# Patient Record
Sex: FEMALE | Race: WHITE | Employment: FULL TIME | URBAN - METROPOLITAN AREA
[De-identification: names, ages, dates, MRNs, and addresses within clinical notes are randomized per-mention and may not be internally consistent; named-entity substitution may affect disease eponyms.]

---

## 2019-06-22 ENCOUNTER — HOSPITAL ENCOUNTER (EMERGENCY)
Facility: CLINIC | Age: 54
Discharge: HOME | End: 2019-06-22
Attending: EMERGENCY MEDICINE | Admitting: EMERGENCY MEDICINE
Payer: COMMERCIAL

## 2019-06-22 VITALS
WEIGHT: 180 LBS | HEART RATE: 82 BPM | DIASTOLIC BLOOD PRESSURE: 83 MMHG | SYSTOLIC BLOOD PRESSURE: 120 MMHG | TEMPERATURE: 97.4 F | OXYGEN SATURATION: 94 % | RESPIRATION RATE: 18 BRPM

## 2019-06-22 DIAGNOSIS — S09.90XA CLOSED HEAD INJURY, INITIAL ENCOUNTER: ICD-10-CM

## 2019-06-22 DIAGNOSIS — S00.03XA CONTUSION OF SCALP, INITIAL ENCOUNTER: ICD-10-CM

## 2019-06-22 DIAGNOSIS — S01.01XA LACERATION OF SCALP, INITIAL ENCOUNTER: ICD-10-CM

## 2019-06-22 PROCEDURE — 99283 EMERGENCY DEPT VISIT LOW MDM: CPT | Mod: 25

## 2019-06-22 PROCEDURE — 12001 RPR S/N/AX/GEN/TRNK 2.5CM/<: CPT

## 2019-06-22 RX ORDER — LIDOCAINE HYDROCHLORIDE AND EPINEPHRINE 10; 10 MG/ML; UG/ML
INJECTION, SOLUTION INFILTRATION; PERINEURAL
Status: DISCONTINUED
Start: 2019-06-22 | End: 2019-06-22 | Stop reason: HOSPADM

## 2019-06-22 ASSESSMENT — ENCOUNTER SYMPTOMS
WEAKNESS: 0
WOUND: 1
CONFUSION: 0
HEADACHES: 0
NECK PAIN: 0

## 2019-06-22 NOTE — ED NOTES
"Pt says she is pain free \"much better than before.\" No other complaints or requests. Neuros intact. MD suturing.   "

## 2019-06-22 NOTE — ED AVS SNAPSHOT
[facility] Emergency Department  [address]  [facility] [address]  Phone:  [phone]  Fax:  [phone]                                    Jacinta Leone   MRN: [id number]    Department:  [facility] Emergency Department   Date of Visit:  6/22/2019           After Visit Summary Signature Page    I have received my discharge instructions, and my questions have been answered. I have discussed any challenges I see with this plan with the nurse or doctor.    ..........................................................................................................................................  Patient/Patient Representative Signature      ..........................................................................................................................................  Patient Representative Print Name and Relationship to Patient    ..................................................               ................................................  Date                                   Time    ..........................................................................................................................................  Reviewed by Signature/Title    ...................................................              ..............................................  Date                                               Time          22EPIC Rev 08/18

## 2019-06-22 NOTE — DISCHARGE INSTRUCTIONS
Discharge Instructions  Head Injury    You have been seen today for a head injury. Your evaluation included a history and physical examination. You may have had a CT (CAT) scan performed, though most head injuries do not require a scan. Based on this evaluation, your provider today does not feel that your head injury is serious.    Generally, every Emergency Department visit should have a follow-up clinic visit with either a primary or a specialty clinic/provider. Please follow-up as instructed by your emergency provider today.  Return to the Emergency Department if:  You are confused or you are not acting right.  Your headache gets worse or you start to have a really bad headache even with your recommended treatment plan.  You vomit (throw up) more than once.  You have a seizure.  You have trouble walking.  You have weakness or paralysis (cannot move) in an arm or a leg.  You have blood or fluid coming from your ears or nose.  You have new symptoms or anything that worries you.    Sleeping:  It is okay for you to sleep, but someone should wake you up if instructed by your provider, and someone should check on you at your usual time to wake up.     Activity:  Do not drive for at least 24 hours.  Do not drive if you have dizzy spells or trouble concentrating, or remembering things.  Do not return to any contact sports until cleared by your regular provider.     MORE INFORMATION:    Concussion:  A concussion is a minor head injury that may cause temporary problems with the way the brain works. Although concussions are important, they are generally not an emergency or a reason that a person needs to be hospitalized. Some concussion symptoms include confusion, amnesia (forgetful), nausea (sick to your stomach) and vomiting (throwing up), dizziness, fatigue, memory or concentration problems, irritability and sleep problems. For most people, concussions are mild and temporary but some will have more severe and persistent  symptoms that require on-going care and treatment.  CT Scans: Your evaluation today may have included a CT scan (CAT scan) to look for things like bleeding or a skull fracture (broken bone).  CT scans involve radiation and too many CT scans can cause serious health problems like cancer, especially in children.  Because of this, your provider may not have ordered a CT scan today if they think you are at low risk for a serious or life threatening problem.    If you were given a prescription for medicine here today, be sure to read all of the information (including the package insert) that comes with your prescription.  This will include important information about the medicine, its side effects, and any warnings that you need to know about.  The pharmacist who fills the prescription can provide more information and answer questions you may have about the medicine.  If you have questions or concerns that the pharmacist cannot address, please call or return to the Emergency Department.     Remember that you can always come back to the Emergency Department if you are not able to see your regular provider in the amount of time listed above, if you get any new symptoms, or if there is anything that worries you.   Discharge Instructions  Laceration (Cut)    You were seen today for a laceration (cut).  Your provider examined your laceration for any problems such a buried foreign body (like glass, a splinter, or gravel), or injury to blood vessels, tendons, and nerves.  Your provider may have also rinsed and/or scrubbed your laceration to help prevent an infection. It may not be possible to find all problems with your laceration on the first visit; occasionally foreign bodies or a tendon injury can go undetected.    Your laceration may have been closed in one of several ways:  No closure: many wounds will heal just fine without closure.  Stitches: regular stitches that require removal.  Staples: skin staples are often used in  the scalp/head.  Wound adhesive (glue): skin glue can be used for certain lacerations and doesn t require removal.  Wound strips (aka Butterfly bandages or steri-strips): these are bandages that help to close a wound.  Absorbable stitches:  dissolving  stitches that go away on their own and usually don t require removal.    A small percentage of wounds will develop an infection regardless of how well the wound is cared for. Antibiotics are generally not indicated to prevent an infection so are only given for a small number of high-risk wounds. Some lacerations are too high risk to close, and are left open to heal because closure can increase the likelihood that an infection will develop.    Remember that all lacerations, no matter how expertly repaired, will cause scarring. We consider many factors, techniques, and materials, in our efforts to provide the best possible cosmetic outcome.    Generally, every Emergency Department visit should have a follow-up clinic visit with either a primary or a specialty clinic/provider. Please follow-up as instructed by your emergency provider today.     Return to the Emergency Department right away if:  You have more redness, swelling, pain, drainage (pus), a bad smell, or red streaking from your laceration as these symptoms could indicate an infection.  You have a fever of 100.4 F or more.  You have bleeding that you cannot stop at home. If your cut starts to bleed, hold pressure on the bleeding area with a clean cloth or put pressure over the bandage.  If the bleeding does not stop after using constant pressure for 30 minutes, you should return to the Emergency Department for further treatment.  An area past the laceration is cool, pale, or blue compared with the other side, or has a slower return of color when squeezed.  Your dressing seems too tight or starts to get uncomfortable or painful. For children, signs of a problem might be irritability or restlessness.  You have  loss of normal function or use of an area, such as being unable to straighten or bend a finger normally.  You have a numb area past the laceration.    Return to the Emergency Department or see your regular provider if:  The laceration starts to come open.   You have something coming out of the cut or a feeling that there is something in the laceration.  Your wound will not heal, or keeps breaking open. There can always be glass, wood, dirt or other things in any wound.  They will not always show up, even on x-rays.  If a wound does not heal, this may be why, and it is important to follow-up with your regular provider.    Home Care:  Take your dressing off in 12-24 hours, or as instructed by your provider, to check your laceration. Remove the dressing sooner if it seems too tight or painful, or if it is getting numb, tingly, or pale past the dressing.  Gently wash your laceration 1-2 times daily with clean water and mild soap. It is okay to shower or run clean water over the laceration, but do not let the laceration soak in water (no swimming).  If your laceration was closed with wound adhesive or strips: pat it dry and leave it open to the air. For all other repairs: after you wash your laceration, or at least 2 times a day, apply antibiotic ointment (such as Neosporin  or Bacitracin ) to the laceration, then cover it with a Band-Aid  or gauze.  Keep the laceration clean. Wear gloves or other protective clothing if you are around dirt.    Follow-up for removal:  If your wound was closed with staples or regular stitches, they need to be removed according to the instructions and timeline specified by your provider today.  If your wound was closed with absorbable ( dissolving ) sutures, they should fall out, dissolve, or not be visible in about one week. If they are still visible, then they should be removed according to the instructions and timeline specified by your provider today.    Scars:  To help minimize  scarring:  Wear sunscreen over the healed laceration when out in the sun.  Massage the area regularly once healed.  You may apply Vitamin E to the healed wound.  Wait. Scars improve in appearance over months and years.    If you were given a prescription for medicine here today, be sure to read all of the information (including the package insert) that comes with your prescription.  This will include important information about the medicine, its side effects, and any warnings that you need to know about.  The pharmacist who fills the prescription can provide more information and answer questions you may have about the medicine.  If you have questions or concerns that the pharmacist cannot address, please call or return to the Emergency Department.       Remember that you can always come back to the Emergency Department if you are not able to see your regular provider in the amount of time listed above, if you get any new symptoms, or if there is anything that worries you.

## 2019-06-22 NOTE — ED TRIAGE NOTES
Pt fell tonight and hit head on doorknob, now lac to back of head.  Denies LOC.  Endorses ETOH use tonight.

## 2019-06-22 NOTE — ED PROVIDER NOTES
History     Chief Complaint:  Fall    HPI   Jacinta Leone is a 54 year old female who presents with fall. The patient notes she had several alcoholic drinks tonight. She notes several hours ago she was walking and tripped knocking her head on the door nob. The patient notes that the area was bleeding and she felt a burning sensation. She states she tried to ice the area for an hour but the pain did not improve, so she decided to come into the ED. She denies neck pain, headache, confusion, weakness, or loss of consciousness. The patient denies any use of blood thinners. The patient reports that her Tdap is up to date.     Allergies:  No Known Allergies     Medications:    Ativan     Past Medical History:    Anxiety   Herniated disk     Social History:  The patient was accompanied to the ED by daughter.  Smoking Status: Current Every Day Smoker   Alcohol Use: Positive  Marital Status:       Review of Systems   Musculoskeletal: Negative for neck pain.   Skin: Positive for wound.   Neurological: Negative for syncope, weakness and headaches.   Psychiatric/Behavioral: Negative for confusion.   All other systems reviewed and are negative.    Physical Exam     Patient Vitals for the past 24 hrs:   BP Temp Pulse Resp SpO2 Weight   06/22/19 0106 124/83 97.4  F (36.3  C) 84 18 97 % 81.6 kg (180 lb)     Physical Exam    General: Adult female sitting upright.  Eyes: PERRL, Conjunctive within normal limits. EOMI.  HENT: Palpable tender posterior scalp hematoma with 1cm overlying gaping laceration. No visible galea or skull. Mild oozing of blood. Moist mucous membranes, oropharynx clear.   Neck: Nontender. Normal AROM.  Resp: Normal respiratory effort.  MSK: Ambulatory with steady gait. Nontender.   Skin: Warm and dry. Scalp laceration as above. No rashes or lesions or ecchymoses on visible skin.  Neuro: Alert and oriented. GCS 15. Responds appropriately to all questions and commands. No focal findings appreciated.  Normal muscle tone.  Psych: Normal mood and affect. Pleasant.  Emergency Department Course   Procedures:    Laceration Repair        LACERATION:  A simple clean 1 cm laceration.      LOCATION:  Posterior scalp       ANESTHESIA:  Local using 1% Lidocaine with epinephrine total of 3 mLs      FUNCTION:  Distally sensation, circulation and motor are intact.      PREPARATION:  Irrigation with Normal Saline      DEBRIDEMENT:  no debridement      CLOSURE:  Wound was closed with One Layer.  Skin closed with 1 x 4.0 Ethylon using interrupted sutures.    Emergency Department Course:  Nursing notes and vitals reviewed.    0112 I performed an exam of the patient as documented above.     Local anesthesia and wound care.     0200 I preformed the laceration repair as noted above. Patient denies any new symptoms or concerns. Note the laceration pain is gone. Prior to discharge, I personally answered all related questions . Patient voiced understanding.     Impression & Plan      Medical Decision Making:  Jacinta Leone is a 54 year old female who presents to the emergency department today for evaluation of  laceration to the back of the head that occurred three hours ago. By the PECARN head CT rules the patient does not warrant head CT evaluation and I believe she is very low risk for skull fracture or intracerebral bleeding. Concussion is likewise of very low probability with no loss of consciousness and normal mental status here. There are no signs of entrapment or displaced fracture on detailed clinical exam. Cervical spine was cleared clinically. The head to toe trauma exam is otherwise negative and further trauma workup is not necessary. The wound was carefully evaluated and explored. The laceration was closed with sutures as described above. There is no evidence of foreign body or bony damage with this laceration. Possible complications including scarring and infection were reviewed with the patient who voiced  understanding. I discussed appropriate return precautions warranting immediate medical treatment and all questions were answered. They will follow up with their PCP as noted in the discharge section. Tetanus is up to date.    Diagnosis:    ICD-10-CM    1. Closed head injury, initial encounter S09.90XA    2. Contusion of scalp, initial encounter S00.03XA    3. Laceration of scalp, initial encounter S01.01XA      Disposition:   The patient is discharged to home.    Discharge Medications:  No discharge medication.     Scribe Disclosure:  Astrid [email or web address], am serving as a scribe at 1:18 AM on 6/22/2019 to document services personally performed by Caryl Emerson MD based on my observations and the provider's statements to me.  [facility] EMERGENCY DEPARTMENT       Caryl Emerson MD  06/22/19 0305

## 2020-09-16 ENCOUNTER — HOSPITAL ENCOUNTER (EMERGENCY)
Facility: CLINIC | Age: 55
Discharge: HOME | End: 2020-09-16
Attending: NURSE PRACTITIONER | Admitting: NURSE PRACTITIONER
Payer: COMMERCIAL

## 2020-09-16 ENCOUNTER — APPOINTMENT (OUTPATIENT)
Dept: MRI IMAGING | Facility: CLINIC | Age: 55
End: 2020-09-16
Attending: NURSE PRACTITIONER
Payer: COMMERCIAL

## 2020-09-16 VITALS
SYSTOLIC BLOOD PRESSURE: 111 MMHG | HEART RATE: 76 BPM | DIASTOLIC BLOOD PRESSURE: 63 MMHG | RESPIRATION RATE: 16 BRPM | OXYGEN SATURATION: 95 % | TEMPERATURE: 96.7 F

## 2020-09-16 DIAGNOSIS — G90.50 RSD (REFLEX SYMPATHETIC DYSTROPHY): ICD-10-CM

## 2020-09-16 DIAGNOSIS — M54.42 ACUTE BILATERAL LOW BACK PAIN WITH LEFT-SIDED SCIATICA: ICD-10-CM

## 2020-09-16 DIAGNOSIS — M54.42 BILATERAL LOW BACK PAIN WITH LEFT-SIDED SCIATICA: ICD-10-CM

## 2020-09-16 PROBLEM — G90.512 COMPLEX REGIONAL PAIN SYNDROME TYPE 1 OF LEFT UPPER EXTREMITY: Status: ACTIVE | Noted: 2020-07-24

## 2020-09-16 PROBLEM — G89.29 NECK PAIN, CHRONIC: Status: ACTIVE | Noted: 2020-07-24

## 2020-09-16 PROBLEM — M54.2 NECK PAIN, CHRONIC: Status: ACTIVE | Noted: 2020-07-24

## 2020-09-16 PROBLEM — F33.1 MODERATE EPISODE OF RECURRENT MAJOR DEPRESSIVE DISORDER (H): Status: ACTIVE | Noted: 2020-09-05

## 2020-09-16 PROBLEM — G44.40 MEDICATION OVERUSE HEADACHE: Status: ACTIVE | Noted: 2020-07-24

## 2020-09-16 LAB
ALBUMIN SERPL-MCNC: 3.8 G/DL (ref 3.4–5)
ALBUMIN UR-MCNC: 10 MG/DL
ALP SERPL-CCNC: 62 U/L (ref 40–150)
ALT SERPL W P-5'-P-CCNC: 39 U/L (ref 0–50)
AMORPH CRY #/AREA URNS HPF: ABNORMAL /HPF
ANION GAP SERPL CALCULATED.3IONS-SCNC: 6 MMOL/L (ref 3–14)
APPEARANCE UR: ABNORMAL
AST SERPL W P-5'-P-CCNC: 23 U/L (ref 0–45)
BACTERIA #/AREA URNS HPF: ABNORMAL /HPF
BASOPHILS # BLD AUTO: 0.1 10E9/L (ref 0–0.2)
BASOPHILS NFR BLD AUTO: 0.5 %
BILIRUB SERPL-MCNC: 0.4 MG/DL (ref 0.2–1.3)
BILIRUB UR QL STRIP: NEGATIVE
BUN SERPL-MCNC: 15 MG/DL (ref 7–30)
CALCIUM SERPL-MCNC: 8.6 MG/DL (ref 8.5–10.1)
CHLORIDE SERPL-SCNC: 110 MMOL/L (ref 94–109)
CO2 SERPL-SCNC: 25 MMOL/L (ref 20–32)
COLOR UR AUTO: YELLOW
CREAT SERPL-MCNC: 0.93 MG/DL (ref 0.52–1.04)
DIFFERENTIAL METHOD BLD: ABNORMAL
EOSINOPHIL # BLD AUTO: 0.4 10E9/L (ref 0–0.7)
EOSINOPHIL NFR BLD AUTO: 4.6 %
ERYTHROCYTE [DISTWIDTH] IN BLOOD BY AUTOMATED COUNT: 12.9 % (ref 10–15)
GFR SERPL CREATININE-BSD FRML MDRD: 70 ML/MIN/{1.73_M2}
GLUCOSE SERPL-MCNC: 106 MG/DL (ref 70–99)
GLUCOSE UR STRIP-MCNC: NEGATIVE MG/DL
HCT VFR BLD AUTO: 47.4 % (ref 35–47)
HGB BLD-MCNC: 15.8 G/DL (ref 11.7–15.7)
HGB UR QL STRIP: NEGATIVE
IMM GRANULOCYTES # BLD: 0 10E9/L (ref 0–0.4)
IMM GRANULOCYTES NFR BLD: 0.3 %
KETONES UR STRIP-MCNC: NEGATIVE MG/DL
LEUKOCYTE ESTERASE UR QL STRIP: ABNORMAL
LYMPHOCYTES # BLD AUTO: 2.5 10E9/L (ref 0.8–5.3)
LYMPHOCYTES NFR BLD AUTO: 25.9 %
MAGNESIUM SERPL-MCNC: 2.3 MG/DL (ref 1.6–2.3)
MCH RBC QN AUTO: 30.6 PG (ref 26.5–33)
MCHC RBC AUTO-ENTMCNC: 33.3 G/DL (ref 31.5–36.5)
MCV RBC AUTO: 92 FL (ref 78–100)
MONOCYTES # BLD AUTO: 0.5 10E9/L (ref 0–1.3)
MONOCYTES NFR BLD AUTO: 5.7 %
MUCOUS THREADS #/AREA URNS LPF: PRESENT /LPF
NEUTROPHILS # BLD AUTO: 6 10E9/L (ref 1.6–8.3)
NEUTROPHILS NFR BLD AUTO: 63 %
NITRATE UR QL: NEGATIVE
NRBC # BLD AUTO: 0 10*3/UL
NRBC BLD AUTO-RTO: 0 /100
PH UR STRIP: 7.5 PH (ref 5–7)
PLATELET # BLD AUTO: 252 10E9/L (ref 150–450)
POTASSIUM SERPL-SCNC: 3.4 MMOL/L (ref 3.4–5.3)
PROT SERPL-MCNC: 7.4 G/DL (ref 6.8–8.8)
RBC # BLD AUTO: 5.17 10E12/L (ref 3.8–5.2)
RBC #/AREA URNS AUTO: 1 /HPF (ref 0–2)
SODIUM SERPL-SCNC: 141 MMOL/L (ref 133–144)
SOURCE: ABNORMAL
SP GR UR STRIP: 1.02 (ref 1–1.03)
SQUAMOUS #/AREA URNS AUTO: 7 /HPF (ref 0–1)
UROBILINOGEN UR STRIP-MCNC: 6 MG/DL (ref 0–2)
WBC # BLD AUTO: 9.5 10E9/L (ref 4–11)
WBC #/AREA URNS AUTO: 3 /HPF (ref 0–5)

## 2020-09-16 PROCEDURE — 25000128 H RX IP 250 OP 636: Performed by: NURSE PRACTITIONER

## 2020-09-16 PROCEDURE — 25800030 ZZH RX IP 258 OP 636: Performed by: NURSE PRACTITIONER

## 2020-09-16 PROCEDURE — 83735 ASSAY OF MAGNESIUM: CPT | Performed by: NURSE PRACTITIONER

## 2020-09-16 PROCEDURE — 85025 COMPLETE CBC W/AUTO DIFF WBC: CPT | Performed by: NURSE PRACTITIONER

## 2020-09-16 PROCEDURE — 96375 TX/PRO/DX INJ NEW DRUG ADDON: CPT

## 2020-09-16 PROCEDURE — 80053 COMPREHEN METABOLIC PANEL: CPT | Performed by: NURSE PRACTITIONER

## 2020-09-16 PROCEDURE — 96361 HYDRATE IV INFUSION ADD-ON: CPT

## 2020-09-16 PROCEDURE — 96374 THER/PROPH/DIAG INJ IV PUSH: CPT

## 2020-09-16 PROCEDURE — 72148 MRI LUMBAR SPINE W/O DYE: CPT

## 2020-09-16 PROCEDURE — 99285 EMERGENCY DEPT VISIT HI MDM: CPT | Mod: 25

## 2020-09-16 PROCEDURE — 81001 URINALYSIS AUTO W/SCOPE: CPT | Performed by: NURSE PRACTITIONER

## 2020-09-16 RX ORDER — KETOROLAC TROMETHAMINE 15 MG/ML
15 INJECTION, SOLUTION INTRAMUSCULAR; INTRAVENOUS ONCE
Status: COMPLETED | OUTPATIENT
Start: 2020-09-16 | End: 2020-09-16

## 2020-09-16 RX ORDER — METOCLOPRAMIDE HYDROCHLORIDE 5 MG/ML
10 INJECTION INTRAMUSCULAR; INTRAVENOUS ONCE
Status: COMPLETED | OUTPATIENT
Start: 2020-09-16 | End: 2020-09-16

## 2020-09-16 RX ORDER — DEXAMETHASONE SODIUM PHOSPHATE 10 MG/ML
10 INJECTION, SOLUTION INTRAMUSCULAR; INTRAVENOUS ONCE
Status: COMPLETED | OUTPATIENT
Start: 2020-09-16 | End: 2020-09-16

## 2020-09-16 RX ORDER — LORAZEPAM 2 MG/ML
0.5 INJECTION INTRAMUSCULAR ONCE
Status: COMPLETED | OUTPATIENT
Start: 2020-09-16 | End: 2020-09-16

## 2020-09-16 RX ADMIN — LORAZEPAM 0.5 MG: 2 INJECTION INTRAMUSCULAR; INTRAVENOUS at 17:43

## 2020-09-16 RX ADMIN — METOCLOPRAMIDE HYDROCHLORIDE 10 MG: 5 INJECTION INTRAMUSCULAR; INTRAVENOUS at 17:07

## 2020-09-16 RX ADMIN — DEXAMETHASONE SODIUM PHOSPHATE 10 MG: 10 INJECTION, SOLUTION INTRAMUSCULAR; INTRAVENOUS at 17:08

## 2020-09-16 RX ADMIN — KETOROLAC TROMETHAMINE 15 MG: 15 INJECTION, SOLUTION INTRAMUSCULAR; INTRAVENOUS at 17:08

## 2020-09-16 RX ADMIN — SODIUM CHLORIDE 1000 ML: 9 INJECTION, SOLUTION INTRAVENOUS at 17:06

## 2020-09-16 ASSESSMENT — ENCOUNTER SYMPTOMS
NECK PAIN: 1
SHORTNESS OF BREATH: 0
MYALGIAS: 1
FEVER: 0
NUMBNESS: 1
BACK PAIN: 1

## 2020-09-16 NOTE — ED TRIAGE NOTES
"Pt reports that she is having neck pain that has gotten worse over the last month. Pt also notes that she feels \"cold from the waist down\" for the last 1.5 wks. Also notes urinary incontinence x2 days. Pt called her pain specialist who told her to come in. Pt used to take pain medication regularly for her neck, but has not been on any pain meds for over 1 month. ABC intact.   "

## 2020-09-16 NOTE — ED AVS SNAPSHOT
[facility] Emergency Department  [address]  [facility] [address]  Phone:  [phone]  Fax:  [phone]                                    Jacinta Leone   MRN: [id number]    Department:  [facility] Emergency Department   Date of Visit:  9/16/2020           After Visit Summary Signature Page    I have received my discharge instructions, and my questions have been answered. I have discussed any challenges I see with this plan with the nurse or doctor.    ..........................................................................................................................................  Patient/Patient Representative Signature      ..........................................................................................................................................  Patient Representative Print Name and Relationship to Patient    ..................................................               ................................................  Date                                   Time    ..........................................................................................................................................  Reviewed by Signature/Title    ...................................................              ..............................................  Date                                               Time          22EPIC Rev 08/18

## 2020-09-16 NOTE — ED PROVIDER NOTES
"  History     Chief Complaint:  Neck Pain and Headache       HPI   Jacinta Leone is a 55 year old female who presents with neck pain and headache. The patient says that for the past month she has had worsening neck pain. She says that the pain has been radiating down her back and to her legs. She says that for the past week and a half she has been \"cold from the waist down\". The patient says that she also had an episode where she was unable to feel her lower anatomy. The patient says that she has also been incontinent of urine for the past 2 days, and notes that yesterday morning when she woke up she found that she had a bowel movement while in bed. The patient says that she has not been eating or drinking normally, stating that she has been mostly in bed due to the pain. She says that when the pain initially started, she would getting shooting pains up her back when she went over bumps, she says that it has progressed to shooting pains when she walks. The patient notes that she had 2 neck surgeries within the past year. Today, the patient called her pain specialist and was told to come to the ED. The patient denies any fever, rash, chest pain, or shortness of breath.      Allergies:  Amoxicillin  Cats  Dust Mites     Medications:    Ativan   Robaxin  Cymbalta  Lyrica  Gabapentin  Zanaflex  Flexeril  Mobic     Past Medical History:    Anxiety   Cervical herniated disc   Cervical spinal stenosis  Bipolar 1 disorder   Major depressive disorder  Tobacco use disorder  Migraine     Past Surgical History:    C section    Ablation   Tubal ligation   Anterior cervical discectomy and fusion     Family History:    Suicide  Drug abuse     Social History:  Smoking Status: Current Smoker  Alcohol Use: Positive  PCP: [facility], [place]      Review of Systems   Constitutional: Negative for fever.   Respiratory: Negative for shortness of breath.    Cardiovascular: Negative for chest pain.   Gastrointestinal:        " Incontinent of urine and stool.    Musculoskeletal: Positive for back pain, myalgias and neck pain.   Skin: Negative for rash.   Neurological: Positive for numbness.   All other systems reviewed and are negative.      Physical Exam     Patient Vitals for the past 24 hrs:   BP Temp Temp src Pulse Resp SpO2   09/16/20 1745 -- -- -- 76 -- 95 %   09/16/20 1715 111/63 -- -- 80 -- 97 %   09/16/20 1700 126/81 -- -- 72 -- 95 %   09/16/20 1608 (!) 158/100 96.7  F (35.9  C) Oral 85 16 99 %        Physical Exam  General: Alert, Mild  discomfort, well kept, morbidly obese  Eyes: PERRL, conjunctivae pink no scleral icterus or conjunctival injection, normal extraocular movements.  ENT:   Moist mucus membranes, posterior oropharynx clear without erythema or exudates, No lymphadenopathy, Normal voice, normal range of motion of neck.  Resp:  Lungs clear to auscultation bilaterally, no crackles/rubs/wheezes. Good air movement  CV:  Normal rate and rhythm, no murmurs/rubs/gallops  GI:  Abdomen soft and non-distended.  Normoactive BS.  No tenderness, guarding or rebound, No masses  Skin:  Warm, dry.  No rashes or petechiae, slight temperature variation between lower extremities and abdomen.  No discoloration.  Musculoskeletal: No peripheral edema or calf tenderness, Normal gross ROM, mild bilateral paravertebral tenderness.  Some tenderness along the sciatic track left lower extremity.  No foot drop.  Neuro: Alert and oriented to person/place/time, normal sharp dull sensation of both lower extremities.  Cranial nerves II through XII are grossly intact.  Psychiatric: Normal affect, cooperative, good eye contact    Emergency Department Course     Imaging:  Radiology findings were communicated with the patient who voiced understanding of the findings.    Lumbar spine MRI w/o contrast  1.  DDD change at L3-4 with generalized disc bulging which results in mild spinal canal stenosis. Left foramen is mildly narrowed due to disc bulging into  the inferior aspect left foramen. Right foramen is moderately narrowed with right-sided foraminal   disc protrusion which causes subtle contact of the exiting right L3 nerve root. Correlation with possible right L3 radiculopathy is suggested.  2.  DDD change at L4-5 with mild spinal canal stenosis due to generalized disc bulging. Mild foraminal narrowing bilaterally.  3.  Mild bilateral facet degeneration at L5-S1.  Reading per radiology     Laboratory:  Laboratory findings were communicated with the patient who voiced understanding of the findings.    UA with micro: pH 7.5 (H), protein albumin 10 (A), urobilinogen 6.0 (H), Leukocyte esterase trace (A), bacteria few (A), squamous epithelial 7 (H), mucous present (A), amorphous crystals few (A) o/w negative    CBC: WBC 9.5, HGB 15.8 (H),   Magnesium: 2.3  CMP: chloride 110 (H),  (H) o/w WNL (Creatinine 0.93)    Procedures    Interventions:  1706 NS 1 L IV   Reglan 10 mg IV   Toradol 15 mg IV   Decadron 10 mg IV   1743 Ativan 0.5 mg IV     Emergency Department Course:    1630 Nursing notes and vitals reviewed.    1641 I performed an exam of the patient as documented above.     1645 IV was inserted and blood was drawn for laboratory testing, results above.     1745 The patient provided a urine sample here in the emergency department. This was sent for laboratory testing, findings above.     1802 The patient was sent for a MRI while in the emergency department, results above.      1853 Patient rechecked and updated.       Findings and plan explained to the Patient. Patient discharged home with instructions regarding supportive care, medications, and reasons to return. The importance of close follow-up was reviewed.     Impression & Plan        Medical Decision Making:  Jacinta Leone is a 55 year old female who presents to the emergency department today for evaluation of headache and lower extremity paresthesia as well as lower back discomfort.   Patient has a longstanding history of neck problems and has had 2 different surgeries.  She has had a history of RSD as well.  Her examination today shows no evidence of decreased lower extremity sensation at time of evaluation.  She is also ambulatory without obvious difficulty.  She did have a slight temperature difference between her upper body and her lower extremities.  The story that she tells is consistent with a history of RSD and I feel this is most likely what she is experiencing in her lower extremity.  Given that she had some incontinence issues that have resolved I did obtain an MRI which showed some bulging disks and facet degeneration as above.  I discussed these findings with patient and she has an appointment with a specialist tomorrow and is advised to follow-up with spinal surgeon.  She felt safe and comfortable being discharged home and I do feel that this is appropriate.  Her examination otherwise showed some mild dehydration through laboratory studies.  She did complain of a migraine type headache and was treated for such as above and had improvement in all of her symptoms.  Again she was ambulatory without difficulty.  There was no emergent need for neurosurgery consult.  She has close follow-up tomorrow.  She is advised on appropriate use of her medications.  Return protocols were discussed.  She appears to be safe and appropriate for outpatient management follow-up and is discharged home.        Diagnosis:    ICD-10-CM    1. Bilateral low back pain with left-sided sciatica  M54.42    2. RSD (reflex sympathetic dystrophy)  G90.50      Disposition:   The patient is discharged to home.     Discharge Medications:  New Prescriptions    No medications on file       Scribe Disclosure:  I, Scott Putnam, am serving as a scribe at 4:30 PM on 9/16/2020 to document services personally performed by Rudolph Hairston APRN based on my observations and the provider's statements to me.        Marika  Rudolph Carter, APRN CNP  09/16/20 [id number]

## 2020-09-17 NOTE — DISCHARGE INSTRUCTIONS
Continue your usual medications.  Use OTC lidocaine patches as directed.   Ice or heat to area.  I recommend ice in the first 24-48 hours.